# Patient Record
Sex: MALE | Race: WHITE | Employment: FULL TIME | URBAN - METROPOLITAN AREA
[De-identification: names, ages, dates, MRNs, and addresses within clinical notes are randomized per-mention and may not be internally consistent; named-entity substitution may affect disease eponyms.]

---

## 2017-08-07 ENCOUNTER — OFFICE VISIT (OUTPATIENT)
Dept: FAMILY MEDICINE CLINIC | Facility: CLINIC | Age: 57
End: 2017-08-07

## 2017-08-07 VITALS
SYSTOLIC BLOOD PRESSURE: 132 MMHG | DIASTOLIC BLOOD PRESSURE: 80 MMHG | OXYGEN SATURATION: 98 % | TEMPERATURE: 98 F | HEIGHT: 71.5 IN | BODY MASS INDEX: 23.14 KG/M2 | HEART RATE: 58 BPM | RESPIRATION RATE: 16 BRPM | WEIGHT: 169 LBS

## 2017-08-07 DIAGNOSIS — L23.7 PLANT ALLERGIC CONTACT DERMATITIS: Primary | ICD-10-CM

## 2017-08-07 PROCEDURE — 99202 OFFICE O/P NEW SF 15 MIN: CPT | Performed by: NURSE PRACTITIONER

## 2017-08-07 NOTE — PROGRESS NOTES
CHIEF COMPLAINT:   Patient presents with:  Rash: on arm         HPI:   Maryuri Zimmerman is a 62year old male who presents for evaluation of a rash. Per patient rash started in the past 5 days. Rash has been improving since onset.   Patient has not had NEURO: Denies abnormal sensation, tingling of the skin, or numbness.       EXAM:   /80   Pulse 58   Temp 98.2 °F (36.8 °C) (Oral)   Resp 16   Ht 71.5\"   Wt 169 lb   SpO2 98%   BMI 23.24 kg/m²   GENERAL: well developed, well nourished,in no apparent d Managing a Poison Ivy, [address], or [employer] Reaction  If you come in contact with urushiol    If you think you may have come in contact with the sap oil contained in these poison ivy, poison oak, and poison sumac plants (urishol), wash the affected When to call your health care provider  Call your health care provider if:  · Your rash is severe  · The rash spreads beyond the exposed part of your body or affects your face.   · The rash does not clear up within a few weeks  You may be given medicine to

## 2017-11-14 PROCEDURE — 81003 URINALYSIS AUTO W/O SCOPE: CPT | Performed by: INTERNAL MEDICINE

## 2018-11-05 PROCEDURE — 81003 URINALYSIS AUTO W/O SCOPE: CPT | Performed by: INTERNAL MEDICINE

## 2019-09-09 PROBLEM — M72.2 PLANTAR FASCIITIS OF LEFT FOOT: Status: ACTIVE | Noted: 2019-09-09

## 2021-05-11 PROCEDURE — 88305 TISSUE EXAM BY PATHOLOGIST: CPT | Performed by: INTERNAL MEDICINE

## 2021-07-22 PROBLEM — S84.91XA: Status: ACTIVE | Noted: 2021-07-22
